# Patient Record
Sex: MALE | Employment: FULL TIME | URBAN - METROPOLITAN AREA
[De-identification: names, ages, dates, MRNs, and addresses within clinical notes are randomized per-mention and may not be internally consistent; named-entity substitution may affect disease eponyms.]

---

## 2024-02-07 ENCOUNTER — CLINICAL DOCUMENTATION (OUTPATIENT)
Age: 19
End: 2024-02-07

## 2024-02-07 RX ORDER — CEPHALEXIN 500 MG/1
500 CAPSULE ORAL 2 TIMES DAILY
COMMUNITY
End: 2024-02-09

## 2024-02-07 RX ORDER — DICYCLOMINE HYDROCHLORIDE 10 MG/1
10 CAPSULE ORAL 3 TIMES DAILY
COMMUNITY
End: 2024-02-09

## 2024-02-07 RX ORDER — ATOMOXETINE 40 MG/1
40 CAPSULE ORAL DAILY
COMMUNITY
End: 2024-02-09

## 2024-02-09 ENCOUNTER — OFFICE VISIT (OUTPATIENT)
Age: 19
End: 2024-02-09
Payer: COMMERCIAL

## 2024-02-09 VITALS
WEIGHT: 104.4 LBS | SYSTOLIC BLOOD PRESSURE: 116 MMHG | HEART RATE: 77 BPM | DIASTOLIC BLOOD PRESSURE: 80 MMHG | OXYGEN SATURATION: 98 %

## 2024-02-09 DIAGNOSIS — R07.9 CHEST PAIN, UNSPECIFIED TYPE: Primary | ICD-10-CM

## 2024-02-09 PROCEDURE — 93000 ELECTROCARDIOGRAM COMPLETE: CPT | Performed by: STUDENT IN AN ORGANIZED HEALTH CARE EDUCATION/TRAINING PROGRAM

## 2024-02-09 PROCEDURE — 99203 OFFICE O/P NEW LOW 30 MIN: CPT | Performed by: STUDENT IN AN ORGANIZED HEALTH CARE EDUCATION/TRAINING PROGRAM

## 2024-02-09 ASSESSMENT — PATIENT HEALTH QUESTIONNAIRE - PHQ9
2. FEELING DOWN, DEPRESSED OR HOPELESS: 0
SUM OF ALL RESPONSES TO PHQ QUESTIONS 1-9: 0
SUM OF ALL RESPONSES TO PHQ QUESTIONS 1-9: 0
SUM OF ALL RESPONSES TO PHQ9 QUESTIONS 1 & 2: 0
SUM OF ALL RESPONSES TO PHQ QUESTIONS 1-9: 0
1. LITTLE INTEREST OR PLEASURE IN DOING THINGS: 0
SUM OF ALL RESPONSES TO PHQ QUESTIONS 1-9: 0

## 2024-02-09 NOTE — PROGRESS NOTES
[place]  [id number] [facility], [address]  [place] [address]    Office [phone],Fax [phone]           Fernando Li is a 18 y.o. male self-referred for chest pain      Assessment/Recommendations:     Diagnosis Orders   1. Chest pain  EKG 12 Lead          Non-cardiac chest pain.  Msk in nature.  Recommend heat, nsaid therapy as needed      Primary Care Physician- None, None    Follow-up as needed        Subjective:   19 yo male presents for evaluation for chest pain.  Intermittent chest pain for 9 months.  Sharp stabbing pain.  Worse with movement.  No family hx of cardiovascular disease.  No family hx of sudden cardiac death      History reviewed. No pertinent past medical history.     History reviewed. No pertinent surgical history.    No current outpatient medications on file.    No Known Allergies     History reviewed. No pertinent family history.    Social History     Tobacco Use    Smoking status: Some Days     Types: Cigarettes    Smokeless tobacco: Never   Substance Use Topics    Alcohol use: Not Currently    Drug use: Yes     Types: Marijuana (Weed)     Comment: every once in a while       Review of Symptoms:  Pertinent Positive: chest pain  Pertinent Negative:no dyspnea, pnd  All Other systems reviewed and are negative for a Comprehensive ROS (10+)    Physical Exam    Vitals:    02/09/24 0910   BP: 116/80   Site: Left Upper Arm   Position: Sitting   Pulse: 77   SpO2: 98%   Weight: 47.4 kg (104 lb 6.4 oz)       Constitutional:  well-developed and well-nourished. Thin.  No distress.  HENT: Normocephalic.   Eyes: No scleral icterus.   Neck:  Neck supple. No JVD present.   Pulmonary/Chest: Effort normal and breath sounds normal. No respiratory distress, wheezes or rales.  Cardiovascular: Normal rate, regular rhythm, S1 S2 . Exam reveals no gallop and no friction rub.  No murmur heard.  No edema.  Extremities:  Normal muscle tone  Abdominal:   No abnormal

## 2024-02-09 NOTE — PROGRESS NOTES
had concerns including Chest Pain and New Patient.    Vitals:    02/09/24 0910   BP: 116/80   Site: Left Upper Arm   Position: Sitting   Pulse: 77   SpO2: 98%   Weight: 47.4 kg (104 lb 6.4 oz)        Chest pain Not today but has intermittent chest pains, describes it as a bit of a stabbing pain  Refills No        1. Have you been to the ER, urgent care clinic since your last visit? Yes [facility] for [facility] about a month ago    Hospitalized since your last visit? No       Where?        Date?